# Patient Record
Sex: FEMALE | Race: WHITE | URBAN - METROPOLITAN AREA
[De-identification: names, ages, dates, MRNs, and addresses within clinical notes are randomized per-mention and may not be internally consistent; named-entity substitution may affect disease eponyms.]

---

## 2021-04-16 ENCOUNTER — APPOINTMENT (RX ONLY)
Dept: URBAN - METROPOLITAN AREA CLINIC 61 | Facility: CLINIC | Age: 66
Setting detail: DERMATOLOGY
End: 2021-04-16

## 2021-04-16 DIAGNOSIS — D485 NEOPLASM OF UNCERTAIN BEHAVIOR OF SKIN: ICD-10-CM | Status: INADEQUATELY CONTROLLED

## 2021-04-16 DIAGNOSIS — D22 MELANOCYTIC NEVI: ICD-10-CM | Status: STABLE

## 2021-04-16 DIAGNOSIS — L57.0 ACTINIC KERATOSIS: ICD-10-CM | Status: INADEQUATELY CONTROLLED

## 2021-04-16 DIAGNOSIS — L85.3 XEROSIS CUTIS: ICD-10-CM

## 2021-04-16 DIAGNOSIS — L81.4 OTHER MELANIN HYPERPIGMENTATION: ICD-10-CM | Status: STABLE

## 2021-04-16 DIAGNOSIS — L70.8 OTHER ACNE: ICD-10-CM

## 2021-04-16 DIAGNOSIS — L82.1 OTHER SEBORRHEIC KERATOSIS: ICD-10-CM | Status: STABLE

## 2021-04-16 DIAGNOSIS — L81.7 PIGMENTED PURPURIC DERMATOSIS: ICD-10-CM | Status: STABLE

## 2021-04-16 DIAGNOSIS — D18.0 HEMANGIOMA: ICD-10-CM | Status: STABLE

## 2021-04-16 DIAGNOSIS — Z85.820 PERSONAL HISTORY OF MALIGNANT MELANOMA OF SKIN: ICD-10-CM

## 2021-04-16 PROBLEM — D48.5 NEOPLASM OF UNCERTAIN BEHAVIOR OF SKIN: Status: ACTIVE | Noted: 2021-04-16

## 2021-04-16 PROBLEM — D18.01 HEMANGIOMA OF SKIN AND SUBCUTANEOUS TISSUE: Status: ACTIVE | Noted: 2021-04-16

## 2021-04-16 PROBLEM — D22.5 MELANOCYTIC NEVI OF TRUNK: Status: ACTIVE | Noted: 2021-04-16

## 2021-04-16 PROCEDURE — 11103 TANGNTL BX SKIN EA SEP/ADDL: CPT

## 2021-04-16 PROCEDURE — ? SUNSCREEN RECOMMENDATIONS

## 2021-04-16 PROCEDURE — 17000 DESTRUCT PREMALG LESION: CPT | Mod: 59

## 2021-04-16 PROCEDURE — ? LIQUID NITROGEN

## 2021-04-16 PROCEDURE — ? FULL BODY SKIN EXAM

## 2021-04-16 PROCEDURE — 99203 OFFICE O/P NEW LOW 30 MIN: CPT | Mod: 25

## 2021-04-16 PROCEDURE — ? BIOPSY BY SHAVE METHOD

## 2021-04-16 PROCEDURE — 10040 EXTRACTION: CPT

## 2021-04-16 PROCEDURE — ? COUNSELING

## 2021-04-16 PROCEDURE — 11102 TANGNTL BX SKIN SINGLE LES: CPT

## 2021-04-16 PROCEDURE — ? ACNE SURGERY

## 2021-04-16 ASSESSMENT — LOCATION DETAILED DESCRIPTION DERM
LOCATION DETAILED: RIGHT ANKLE
LOCATION DETAILED: LOWER STERNUM
LOCATION DETAILED: EPIGASTRIC SKIN
LOCATION DETAILED: RIGHT INFERIOR LATERAL FOREHEAD
LOCATION DETAILED: LEFT INFERIOR MEDIAL UPPER BACK
LOCATION DETAILED: RIGHT SUPERIOR MEDIAL UPPER BACK
LOCATION DETAILED: LEFT SUPERIOR UPPER BACK
LOCATION DETAILED: SUBXIPHOID
LOCATION DETAILED: RIGHT INFERIOR MEDIAL UPPER BACK

## 2021-04-16 ASSESSMENT — LOCATION SIMPLE DESCRIPTION DERM
LOCATION SIMPLE: RIGHT UPPER BACK
LOCATION SIMPLE: ABDOMEN
LOCATION SIMPLE: CHEST
LOCATION SIMPLE: RIGHT FOREHEAD
LOCATION SIMPLE: LEFT UPPER BACK
LOCATION SIMPLE: RIGHT ANKLE

## 2021-04-16 ASSESSMENT — LOCATION ZONE DERM
LOCATION ZONE: TRUNK
LOCATION ZONE: FACE
LOCATION ZONE: LEG

## 2021-04-16 ASSESSMENT — SEVERITY ASSESSMENT: SEVERITY: MILD

## 2021-04-16 NOTE — PROCEDURE: ACNE SURGERY
Render Number Of Lesions Treated: no
Extraction Method: 11 blade and q-tip
Post-Care Instructions: I reviewed with the patient in detail post-care instructions. Patient is to keep the treatment areas dry overnight, and then apply bacitracin twice daily until healed. Patient may apply hydrogen peroxide soaks to remove any crusting.
Consent was obtained and risks were reviewed including but not limited to scarring, infection, bleeding, scabbing, incomplete removal, and allergy to anesthesia.
Prep Text (Optional): Prior to removal the treatment areas were prepped in the usual fashion.
Detail Level: Detailed
Acne Type: Comedonal Lesions

## 2021-05-04 ENCOUNTER — APPOINTMENT (RX ONLY)
Dept: URBAN - METROPOLITAN AREA CLINIC 61 | Facility: CLINIC | Age: 66
Setting detail: DERMATOLOGY
End: 2021-05-04

## 2021-05-04 VITALS — TEMPERATURE: 97.6 F

## 2021-05-04 PROBLEM — D03.59 MELANOMA IN SITU OF OTHER PART OF TRUNK: Status: ACTIVE | Noted: 2021-05-04

## 2021-05-04 PROCEDURE — 12032 INTMD RPR S/A/T/EXT 2.6-7.5: CPT

## 2021-05-04 PROCEDURE — 11602 EXC TR-EXT MAL+MARG 1.1-2 CM: CPT

## 2021-05-04 PROCEDURE — ? EXCISION

## 2021-05-18 ENCOUNTER — APPOINTMENT (RX ONLY)
Dept: URBAN - METROPOLITAN AREA CLINIC 61 | Facility: CLINIC | Age: 66
Setting detail: DERMATOLOGY
End: 2021-05-18

## 2021-05-18 DIAGNOSIS — Z48.02 ENCOUNTER FOR REMOVAL OF SUTURES: ICD-10-CM

## 2021-05-18 PROCEDURE — 99024 POSTOP FOLLOW-UP VISIT: CPT

## 2021-05-18 PROCEDURE — ? SUTURE REMOVAL (GLOBAL PERIOD)

## 2021-05-18 ASSESSMENT — LOCATION ZONE DERM: LOCATION ZONE: TRUNK

## 2021-05-18 ASSESSMENT — LOCATION SIMPLE DESCRIPTION DERM: LOCATION SIMPLE: RIGHT UPPER BACK

## 2021-05-18 ASSESSMENT — LOCATION DETAILED DESCRIPTION DERM: LOCATION DETAILED: RIGHT MEDIAL UPPER BACK

## 2021-05-18 NOTE — PROCEDURE: SUTURE REMOVAL (GLOBAL PERIOD)
Detail Level: Detailed
Add 53375 Cpt? (Important Note: In 2017 The Use Of 45740 Is Being Tracked By Cms To Determine Future Global Period Reimbursement For Global Periods): yes

## 2021-08-17 ENCOUNTER — APPOINTMENT (RX ONLY)
Dept: URBAN - METROPOLITAN AREA CLINIC 61 | Facility: CLINIC | Age: 66
Setting detail: DERMATOLOGY
End: 2021-08-17

## 2021-08-17 DIAGNOSIS — Z12.83 ENCOUNTER FOR SCREENING FOR MALIGNANT NEOPLASM OF SKIN: ICD-10-CM

## 2021-08-17 DIAGNOSIS — Z85.820 PERSONAL HISTORY OF MALIGNANT MELANOMA OF SKIN: ICD-10-CM

## 2021-08-17 DIAGNOSIS — L81.4 OTHER MELANIN HYPERPIGMENTATION: ICD-10-CM

## 2021-08-17 DIAGNOSIS — L82.1 OTHER SEBORRHEIC KERATOSIS: ICD-10-CM

## 2021-08-17 DIAGNOSIS — L20.89 OTHER ATOPIC DERMATITIS: ICD-10-CM | Status: INADEQUATELY CONTROLLED

## 2021-08-17 DIAGNOSIS — D22 MELANOCYTIC NEVI: ICD-10-CM

## 2021-08-17 DIAGNOSIS — D18.0 HEMANGIOMA: ICD-10-CM

## 2021-08-17 PROBLEM — D22.5 MELANOCYTIC NEVI OF TRUNK: Status: ACTIVE | Noted: 2021-08-17

## 2021-08-17 PROBLEM — D18.01 HEMANGIOMA OF SKIN AND SUBCUTANEOUS TISSUE: Status: ACTIVE | Noted: 2021-08-17

## 2021-08-17 PROCEDURE — ? COUNSELING

## 2021-08-17 PROCEDURE — ? PRESCRIPTION

## 2021-08-17 PROCEDURE — ? SUNSCREEN RECOMMENDATIONS

## 2021-08-17 PROCEDURE — 99214 OFFICE O/P EST MOD 30 MIN: CPT

## 2021-08-17 PROCEDURE — ? FULL BODY SKIN EXAM

## 2021-08-17 RX ORDER — TRIAMCINOLONE ACETONIDE 1 MG/G
CREAM TOPICAL BID
Qty: 1 | Refills: 6 | Status: ERX | COMMUNITY
Start: 2021-08-17

## 2021-08-17 RX ADMIN — TRIAMCINOLONE ACETONIDE 1: 1 CREAM TOPICAL at 00:00

## 2021-08-17 ASSESSMENT — LOCATION SIMPLE DESCRIPTION DERM
LOCATION SIMPLE: RIGHT ELBOW
LOCATION SIMPLE: RIGHT UPPER BACK
LOCATION SIMPLE: ABDOMEN

## 2021-08-17 ASSESSMENT — LOCATION ZONE DERM
LOCATION ZONE: TRUNK
LOCATION ZONE: ARM

## 2021-08-17 ASSESSMENT — LOCATION DETAILED DESCRIPTION DERM
LOCATION DETAILED: RIGHT MEDIAL UPPER BACK
LOCATION DETAILED: PERIUMBILICAL SKIN
LOCATION DETAILED: RIGHT SUPERIOR MEDIAL UPPER BACK
LOCATION DETAILED: RIGHT ELBOW

## 2021-08-17 ASSESSMENT — SEVERITY ASSESSMENT 2020: SEVERITY 2020: MILD

## 2021-11-16 ENCOUNTER — APPOINTMENT (RX ONLY)
Dept: URBAN - METROPOLITAN AREA CLINIC 61 | Facility: CLINIC | Age: 66
Setting detail: DERMATOLOGY
End: 2021-11-16

## 2021-11-16 DIAGNOSIS — L82.1 OTHER SEBORRHEIC KERATOSIS: ICD-10-CM

## 2021-11-16 DIAGNOSIS — Z86.006 PERSONAL HISTORY OF MELANOMA IN-SITU: ICD-10-CM | Status: RESOLVED

## 2021-11-16 DIAGNOSIS — D22 MELANOCYTIC NEVI: ICD-10-CM | Status: STABLE

## 2021-11-16 DIAGNOSIS — D18.0 HEMANGIOMA: ICD-10-CM

## 2021-11-16 DIAGNOSIS — L81.4 OTHER MELANIN HYPERPIGMENTATION: ICD-10-CM | Status: STABLE

## 2021-11-16 DIAGNOSIS — H02.83 DERMATOCHALASIS OF EYELID: ICD-10-CM

## 2021-11-16 DIAGNOSIS — Z12.83 ENCOUNTER FOR SCREENING FOR MALIGNANT NEOPLASM OF SKIN: ICD-10-CM

## 2021-11-16 PROBLEM — D22.9 MELANOCYTIC NEVI, UNSPECIFIED: Status: ACTIVE | Noted: 2021-11-16

## 2021-11-16 PROBLEM — D18.01 HEMANGIOMA OF SKIN AND SUBCUTANEOUS TISSUE: Status: ACTIVE | Noted: 2021-11-16

## 2021-11-16 PROBLEM — Z85.820 PERSONAL HISTORY OF MALIGNANT MELANOMA OF SKIN: Status: ACTIVE | Noted: 2021-11-16

## 2021-11-16 PROBLEM — H02.839 DERMATOCHALASIS OF UNSPECIFIED EYE, UNSPECIFIED EYELID: Status: ACTIVE | Noted: 2021-11-16

## 2021-11-16 PROCEDURE — ? ADDITIONAL NOTES

## 2021-11-16 PROCEDURE — ? PATIENT SPECIFIC COUNSELING

## 2021-11-16 PROCEDURE — ? COUNSELING

## 2021-11-16 PROCEDURE — ? FULL BODY SKIN EXAM

## 2021-11-16 PROCEDURE — 99213 OFFICE O/P EST LOW 20 MIN: CPT

## 2021-11-16 ASSESSMENT — LOCATION DETAILED DESCRIPTION DERM
LOCATION DETAILED: LEFT LATERAL EYEBROW
LOCATION DETAILED: LEFT PROXIMAL DORSAL FOREARM
LOCATION DETAILED: INFERIOR THORACIC SPINE
LOCATION DETAILED: RIGHT LATERAL EYEBROW
LOCATION DETAILED: LEFT ANTERIOR DISTAL THIGH

## 2021-11-16 ASSESSMENT — LOCATION ZONE DERM
LOCATION ZONE: FACE
LOCATION ZONE: LEG
LOCATION ZONE: ARM
LOCATION ZONE: TRUNK

## 2021-11-16 ASSESSMENT — LOCATION SIMPLE DESCRIPTION DERM
LOCATION SIMPLE: LEFT EYEBROW
LOCATION SIMPLE: UPPER BACK
LOCATION SIMPLE: LEFT THIGH
LOCATION SIMPLE: RIGHT EYEBROW
LOCATION SIMPLE: LEFT FOREARM

## 2021-11-16 NOTE — HPI: OTHER
Condition:: excess skin above eyelid
Please Describe Your Condition:: Patient complains of excess skin above eyelids that interferes with vision and causes her eyelids to feel heavy. Presents for evaluation and management.

## 2022-04-06 ENCOUNTER — APPOINTMENT (RX ONLY)
Dept: URBAN - METROPOLITAN AREA CLINIC 61 | Facility: CLINIC | Age: 67
Setting detail: DERMATOLOGY
End: 2022-04-06

## 2022-04-06 DIAGNOSIS — D22 MELANOCYTIC NEVI: ICD-10-CM

## 2022-04-06 DIAGNOSIS — Z86.006 PERSONAL HISTORY OF MELANOMA IN-SITU: ICD-10-CM

## 2022-04-06 DIAGNOSIS — L81.4 OTHER MELANIN HYPERPIGMENTATION: ICD-10-CM

## 2022-04-06 DIAGNOSIS — D49.2 NEOPLASM OF UNSPECIFIED BEHAVIOR OF BONE, SOFT TISSUE, AND SKIN: ICD-10-CM | Status: WORSENING

## 2022-04-06 DIAGNOSIS — Z71.89 OTHER SPECIFIED COUNSELING: ICD-10-CM

## 2022-04-06 DIAGNOSIS — L90.5 SCAR CONDITIONS AND FIBROSIS OF SKIN: ICD-10-CM

## 2022-04-06 DIAGNOSIS — L82.1 OTHER SEBORRHEIC KERATOSIS: ICD-10-CM

## 2022-04-06 PROBLEM — D22.5 MELANOCYTIC NEVI OF TRUNK: Status: ACTIVE | Noted: 2022-04-06

## 2022-04-06 PROCEDURE — ? SHAVE REMOVAL

## 2022-04-06 PROCEDURE — ? FULL BODY SKIN EXAM

## 2022-04-06 PROCEDURE — 99213 OFFICE O/P EST LOW 20 MIN: CPT | Mod: 25

## 2022-04-06 PROCEDURE — ? ADDITIONAL NOTES

## 2022-04-06 PROCEDURE — ? TREATMENT REGIMEN

## 2022-04-06 PROCEDURE — ? COUNSELING

## 2022-04-06 PROCEDURE — 11300 SHAVE SKIN LESION 0.5 CM/<: CPT

## 2022-04-06 ASSESSMENT — LOCATION SIMPLE DESCRIPTION DERM
LOCATION SIMPLE: UPPER BACK
LOCATION SIMPLE: LEFT FOREARM
LOCATION SIMPLE: UPPER BACK
LOCATION SIMPLE: RIGHT FOREARM
LOCATION SIMPLE: LEFT PRETIBIAL REGION

## 2022-04-06 ASSESSMENT — LOCATION DETAILED DESCRIPTION DERM
LOCATION DETAILED: RIGHT PROXIMAL RADIAL DORSAL FOREARM
LOCATION DETAILED: SUPERIOR THORACIC SPINE
LOCATION DETAILED: INFERIOR THORACIC SPINE
LOCATION DETAILED: INFERIOR THORACIC SPINE
LOCATION DETAILED: LEFT PROXIMAL DORSAL FOREARM
LOCATION DETAILED: LEFT LATERAL PROXIMAL PRETIBIAL REGION

## 2022-04-06 ASSESSMENT — LOCATION ZONE DERM
LOCATION ZONE: TRUNK
LOCATION ZONE: TRUNK
LOCATION ZONE: ARM
LOCATION ZONE: LEG

## 2022-04-06 NOTE — PROCEDURE: SHAVE REMOVAL
Medical Necessity Information: It is in your best interest to select a reason for this procedure from the list below. All of these items fulfill various CMS LCD requirements except the new and changing color options.
Medical Necessity Clause: The provider's intent was to remove the lesion in its entirety while at the same time obtaining tissue for pathology. This procedure was medically necessary because the lesion that was treated was:
Lab: 6
Lab Facility: 3
Detail Level: Detailed
Was A Bandage Applied: Yes
Size Of Lesion In Cm (Required): 0.3
X Size Of Lesion In Cm (Optional): 0
Biopsy Method: Personna blade
Anesthesia Type: 1% lidocaine with epinephrine
Anesthesia Volume In Cc: 0.5
Hemostasis: Drysol
Wound Care: Vaseline
Render Path Notes In Note?: No
Consent was obtained from the patient. The risks and benefits to therapy were discussed in detail. Specifically, the risks of infection, scarring, bleeding, prolonged wound healing, incomplete removal, allergy to anesthesia, nerve injury and recurrence were addressed. Prior to the procedure, the treatment site was clearly identified and confirmed by the patient. All components of Universal Protocol completed.
Post-Care Instructions: I reviewed with the patient in detail post-care instructions. Patient is to keep the biopsy site dry overnight, and then apply vaseline twice daily until healed.
Notification Instructions: Patient will be notified of pathology results. However, patient instructed to call the office if not contacted within 2 weeks.
Billing Type: Third-Party Bill

## 2022-08-11 ENCOUNTER — APPOINTMENT (RX ONLY)
Dept: URBAN - METROPOLITAN AREA CLINIC 61 | Facility: CLINIC | Age: 67
Setting detail: DERMATOLOGY
End: 2022-08-11

## 2022-08-11 DIAGNOSIS — D18.0 HEMANGIOMA: ICD-10-CM

## 2022-08-11 DIAGNOSIS — Z86.006 PERSONAL HISTORY OF MELANOMA IN-SITU: ICD-10-CM

## 2022-08-11 DIAGNOSIS — B35.3 TINEA PEDIS: ICD-10-CM | Status: STABLE

## 2022-08-11 DIAGNOSIS — L82.1 OTHER SEBORRHEIC KERATOSIS: ICD-10-CM

## 2022-08-11 DIAGNOSIS — L90.8 OTHER ATROPHIC DISORDERS OF SKIN: ICD-10-CM | Status: INADEQUATELY CONTROLLED

## 2022-08-11 DIAGNOSIS — D22 MELANOCYTIC NEVI: ICD-10-CM

## 2022-08-11 DIAGNOSIS — L81.4 OTHER MELANIN HYPERPIGMENTATION: ICD-10-CM

## 2022-08-11 DIAGNOSIS — Z12.83 ENCOUNTER FOR SCREENING FOR MALIGNANT NEOPLASM OF SKIN: ICD-10-CM

## 2022-08-11 PROBLEM — D22.5 MELANOCYTIC NEVI OF TRUNK: Status: ACTIVE | Noted: 2022-08-11

## 2022-08-11 PROBLEM — D18.01 HEMANGIOMA OF SKIN AND SUBCUTANEOUS TISSUE: Status: ACTIVE | Noted: 2022-08-11

## 2022-08-11 PROCEDURE — 99213 OFFICE O/P EST LOW 20 MIN: CPT

## 2022-08-11 PROCEDURE — ? ADDITIONAL NOTES

## 2022-08-11 PROCEDURE — ? SUNSCREEN RECOMMENDATIONS

## 2022-08-11 PROCEDURE — ? FULL BODY SKIN EXAM

## 2022-08-11 PROCEDURE — ? PRESCRIPTION

## 2022-08-11 PROCEDURE — ? COUNSELING

## 2022-08-11 RX ORDER — KETOCONAZOLE 20 MG/G
CREAM TOPICAL BID
Qty: 30 | Refills: 1 | Status: ERX | COMMUNITY
Start: 2022-08-11

## 2022-08-11 RX ADMIN — KETOCONAZOLE: 20 CREAM TOPICAL at 00:00

## 2022-08-11 ASSESSMENT — LOCATION SIMPLE DESCRIPTION DERM
LOCATION SIMPLE: LEFT EYEBROW
LOCATION SIMPLE: LOWER BACK
LOCATION SIMPLE: RIGHT 5TH TOE
LOCATION SIMPLE: RIGHT EYEBROW
LOCATION SIMPLE: RIGHT UPPER BACK
LOCATION SIMPLE: UPPER BACK
LOCATION SIMPLE: ABDOMEN
LOCATION SIMPLE: CHEST
LOCATION SIMPLE: LEFT UPPER BACK

## 2022-08-11 ASSESSMENT — LOCATION DETAILED DESCRIPTION DERM
LOCATION DETAILED: PERIUMBILICAL SKIN
LOCATION DETAILED: RIGHT INFERIOR MEDIAL UPPER BACK
LOCATION DETAILED: LEFT LATERAL EYEBROW
LOCATION DETAILED: LEFT MEDIAL UPPER BACK
LOCATION DETAILED: SUPERIOR THORACIC SPINE
LOCATION DETAILED: UPPER STERNUM
LOCATION DETAILED: RIGHT LATERAL EYEBROW
LOCATION DETAILED: RIGHT MEDIAL 5TH TOE
LOCATION DETAILED: SUPERIOR LUMBAR SPINE

## 2022-08-11 ASSESSMENT — LOCATION ZONE DERM
LOCATION ZONE: TRUNK
LOCATION ZONE: TOE
LOCATION ZONE: FACE

## 2022-12-22 ENCOUNTER — APPOINTMENT (RX ONLY)
Dept: URBAN - METROPOLITAN AREA CLINIC 61 | Facility: CLINIC | Age: 67
Setting detail: DERMATOLOGY
End: 2022-12-22

## 2022-12-22 DIAGNOSIS — L29.89 OTHER PRURITUS: ICD-10-CM | Status: INADEQUATELY CONTROLLED

## 2022-12-22 DIAGNOSIS — L90.8 OTHER ATROPHIC DISORDERS OF SKIN: ICD-10-CM | Status: WORSENING

## 2022-12-22 DIAGNOSIS — L81.4 OTHER MELANIN HYPERPIGMENTATION: ICD-10-CM

## 2022-12-22 DIAGNOSIS — Q828 OTHER SPECIFIED ANOMALIES OF SKIN: ICD-10-CM | Status: INADEQUATELY CONTROLLED

## 2022-12-22 DIAGNOSIS — Z12.83 ENCOUNTER FOR SCREENING FOR MALIGNANT NEOPLASM OF SKIN: ICD-10-CM

## 2022-12-22 DIAGNOSIS — L29.8 OTHER PRURITUS: ICD-10-CM | Status: INADEQUATELY CONTROLLED

## 2022-12-22 DIAGNOSIS — Q819 OTHER SPECIFIED ANOMALIES OF SKIN: ICD-10-CM | Status: INADEQUATELY CONTROLLED

## 2022-12-22 DIAGNOSIS — D22 MELANOCYTIC NEVI: ICD-10-CM

## 2022-12-22 DIAGNOSIS — Z86.006 PERSONAL HISTORY OF MELANOMA IN-SITU: ICD-10-CM

## 2022-12-22 DIAGNOSIS — D18.0 HEMANGIOMA: ICD-10-CM

## 2022-12-22 DIAGNOSIS — L82.1 OTHER SEBORRHEIC KERATOSIS: ICD-10-CM

## 2022-12-22 DIAGNOSIS — Q826 OTHER SPECIFIED ANOMALIES OF SKIN: ICD-10-CM | Status: INADEQUATELY CONTROLLED

## 2022-12-22 PROBLEM — D18.01 HEMANGIOMA OF SKIN AND SUBCUTANEOUS TISSUE: Status: ACTIVE | Noted: 2022-12-22

## 2022-12-22 PROBLEM — L85.8 OTHER SPECIFIED EPIDERMAL THICKENING: Status: ACTIVE | Noted: 2022-12-22

## 2022-12-22 PROBLEM — D22.5 MELANOCYTIC NEVI OF TRUNK: Status: ACTIVE | Noted: 2022-12-22

## 2022-12-22 PROCEDURE — ? FULL BODY SKIN EXAM

## 2022-12-22 PROCEDURE — ? COUNSELING

## 2022-12-22 PROCEDURE — ? PRESCRIPTION

## 2022-12-22 PROCEDURE — ? ADDITIONAL NOTES

## 2022-12-22 PROCEDURE — 99214 OFFICE O/P EST MOD 30 MIN: CPT

## 2022-12-22 RX ORDER — CLOBETASOL PROPIONATE 0.5 MG/ML
SOLUTION TOPICAL
Qty: 100 | Refills: 6 | Status: ERX | COMMUNITY
Start: 2022-12-22

## 2022-12-22 RX ORDER — AMMONIUM LACTATE 12 %
LOTION (GRAM) TOPICAL AS DIRECTED
Qty: 227 | Refills: 6 | Status: ERX | COMMUNITY
Start: 2022-12-22

## 2022-12-22 RX ADMIN — CLOBETASOL PROPIONATE: 0.5 SOLUTION TOPICAL at 00:00

## 2022-12-22 RX ADMIN — Medication: at 00:00

## 2022-12-22 ASSESSMENT — LOCATION SIMPLE DESCRIPTION DERM
LOCATION SIMPLE: LEFT THIGH
LOCATION SIMPLE: LOWER BACK
LOCATION SIMPLE: UPPER BACK
LOCATION SIMPLE: LEFT UPPER BACK
LOCATION SIMPLE: RIGHT EYEBROW
LOCATION SIMPLE: RIGHT POSTERIOR UPPER ARM
LOCATION SIMPLE: RIGHT UPPER BACK
LOCATION SIMPLE: CHEST
LOCATION SIMPLE: ABDOMEN
LOCATION SIMPLE: LEFT POSTERIOR UPPER ARM
LOCATION SIMPLE: LEFT EYEBROW
LOCATION SIMPLE: RIGHT THIGH

## 2022-12-22 ASSESSMENT — LOCATION DETAILED DESCRIPTION DERM
LOCATION DETAILED: LEFT LATERAL EYEBROW
LOCATION DETAILED: UPPER STERNUM
LOCATION DETAILED: SUPERIOR THORACIC SPINE
LOCATION DETAILED: LEFT DISTAL POSTERIOR UPPER ARM
LOCATION DETAILED: SUPERIOR LUMBAR SPINE
LOCATION DETAILED: RIGHT ANTERIOR DISTAL THIGH
LOCATION DETAILED: RIGHT DISTAL POSTERIOR UPPER ARM
LOCATION DETAILED: RIGHT LATERAL EYEBROW
LOCATION DETAILED: PERIUMBILICAL SKIN
LOCATION DETAILED: RIGHT INFERIOR MEDIAL UPPER BACK
LOCATION DETAILED: LEFT ANTERIOR DISTAL THIGH
LOCATION DETAILED: LEFT MEDIAL UPPER BACK

## 2022-12-22 ASSESSMENT — LOCATION ZONE DERM
LOCATION ZONE: FACE
LOCATION ZONE: TRUNK
LOCATION ZONE: LEG
LOCATION ZONE: ARM

## 2022-12-22 ASSESSMENT — SEVERITY ASSESSMENT: SEVERITY: MODERATE

## 2022-12-22 NOTE — HPI: SKIN IRRITATION
How Severe Is Your Skin Irritation?: moderate
Additional History: Intermittent itching only when dairy is consumed.

## 2023-03-23 ENCOUNTER — APPOINTMENT (RX ONLY)
Dept: URBAN - METROPOLITAN AREA CLINIC 61 | Facility: CLINIC | Age: 68
Setting detail: DERMATOLOGY
End: 2023-03-23

## 2023-03-23 DIAGNOSIS — L81.4 OTHER MELANIN HYPERPIGMENTATION: ICD-10-CM

## 2023-03-23 DIAGNOSIS — Z86.006 PERSONAL HISTORY OF MELANOMA IN-SITU: ICD-10-CM

## 2023-03-23 DIAGNOSIS — Z12.83 ENCOUNTER FOR SCREENING FOR MALIGNANT NEOPLASM OF SKIN: ICD-10-CM

## 2023-03-23 DIAGNOSIS — D22 MELANOCYTIC NEVI: ICD-10-CM

## 2023-03-23 DIAGNOSIS — L82.1 OTHER SEBORRHEIC KERATOSIS: ICD-10-CM

## 2023-03-23 DIAGNOSIS — D18.0 HEMANGIOMA: ICD-10-CM

## 2023-03-23 PROBLEM — D18.01 HEMANGIOMA OF SKIN AND SUBCUTANEOUS TISSUE: Status: ACTIVE | Noted: 2023-03-23

## 2023-03-23 PROBLEM — D22.5 MELANOCYTIC NEVI OF TRUNK: Status: ACTIVE | Noted: 2023-03-23

## 2023-03-23 PROCEDURE — ? FULL BODY SKIN EXAM

## 2023-03-23 PROCEDURE — ? ADDITIONAL NOTES

## 2023-03-23 PROCEDURE — ? COUNSELING

## 2023-03-23 PROCEDURE — 99213 OFFICE O/P EST LOW 20 MIN: CPT

## 2023-03-23 ASSESSMENT — LOCATION ZONE DERM: LOCATION ZONE: TRUNK

## 2023-03-23 ASSESSMENT — LOCATION DETAILED DESCRIPTION DERM
LOCATION DETAILED: UPPER STERNUM
LOCATION DETAILED: PERIUMBILICAL SKIN
LOCATION DETAILED: RIGHT INFERIOR MEDIAL UPPER BACK
LOCATION DETAILED: SUPERIOR LUMBAR SPINE
LOCATION DETAILED: LEFT MEDIAL UPPER BACK
LOCATION DETAILED: SUPERIOR THORACIC SPINE

## 2023-03-23 ASSESSMENT — LOCATION SIMPLE DESCRIPTION DERM
LOCATION SIMPLE: UPPER BACK
LOCATION SIMPLE: RIGHT UPPER BACK
LOCATION SIMPLE: ABDOMEN
LOCATION SIMPLE: LEFT UPPER BACK
LOCATION SIMPLE: LOWER BACK
LOCATION SIMPLE: CHEST

## 2023-06-29 ENCOUNTER — APPOINTMENT (RX ONLY)
Dept: URBAN - METROPOLITAN AREA CLINIC 61 | Facility: CLINIC | Age: 68
Setting detail: DERMATOLOGY
End: 2023-06-29

## 2023-06-29 DIAGNOSIS — D18.0 HEMANGIOMA: ICD-10-CM

## 2023-06-29 DIAGNOSIS — L81.4 OTHER MELANIN HYPERPIGMENTATION: ICD-10-CM

## 2023-06-29 DIAGNOSIS — Z12.83 ENCOUNTER FOR SCREENING FOR MALIGNANT NEOPLASM OF SKIN: ICD-10-CM

## 2023-06-29 DIAGNOSIS — D22 MELANOCYTIC NEVI: ICD-10-CM

## 2023-06-29 DIAGNOSIS — Z86.006 PERSONAL HISTORY OF MELANOMA IN-SITU: ICD-10-CM

## 2023-06-29 DIAGNOSIS — L82.1 OTHER SEBORRHEIC KERATOSIS: ICD-10-CM

## 2023-06-29 PROBLEM — D18.01 HEMANGIOMA OF SKIN AND SUBCUTANEOUS TISSUE: Status: ACTIVE | Noted: 2023-06-29

## 2023-06-29 PROBLEM — D22.5 MELANOCYTIC NEVI OF TRUNK: Status: ACTIVE | Noted: 2023-06-29

## 2023-06-29 PROCEDURE — ? COUNSELING

## 2023-06-29 PROCEDURE — ? FULL BODY SKIN EXAM

## 2023-06-29 PROCEDURE — 99213 OFFICE O/P EST LOW 20 MIN: CPT

## 2023-06-29 PROCEDURE — ? ADDITIONAL NOTES

## 2023-06-29 ASSESSMENT — LOCATION SIMPLE DESCRIPTION DERM
LOCATION SIMPLE: RIGHT UPPER BACK
LOCATION SIMPLE: ABDOMEN
LOCATION SIMPLE: CHEST
LOCATION SIMPLE: UPPER BACK
LOCATION SIMPLE: LOWER BACK
LOCATION SIMPLE: LEFT UPPER BACK

## 2023-06-29 ASSESSMENT — LOCATION ZONE DERM: LOCATION ZONE: TRUNK

## 2023-06-29 ASSESSMENT — LOCATION DETAILED DESCRIPTION DERM
LOCATION DETAILED: SUPERIOR LUMBAR SPINE
LOCATION DETAILED: RIGHT INFERIOR MEDIAL UPPER BACK
LOCATION DETAILED: LEFT MEDIAL UPPER BACK
LOCATION DETAILED: UPPER STERNUM
LOCATION DETAILED: SUPERIOR THORACIC SPINE
LOCATION DETAILED: PERIUMBILICAL SKIN

## 2023-06-29 NOTE — HPI: EVALUATION OF SKIN LESION(S)
Hpi Title: Evaluation of Skin Lesions
How Severe Are Your Spot(S)?: mild
Have Your Spot(S) Been Treated In The Past?: has not been treated
Year Removed: 2021 may

## 2023-06-29 NOTE — PROCEDURE: MIPS QUALITY
Quality 137: Melanoma: Continuity Of Care - Recall System: Patient information entered into a recall system that includes: target date for the next exam specified AND a process to follow up with patients regarding missed or unscheduled appointments
Detail Level: Detailed
Quality 138: Melanoma: Coordination Of Care: A treatment plan was communicated to the physicians providing continuing care within one month of diagnosis outlining: diagnosis, tumor thickness and a plan for surgery or alternate care.
negative...

## 2024-01-18 ENCOUNTER — APPOINTMENT (RX ONLY)
Dept: URBAN - METROPOLITAN AREA CLINIC 61 | Facility: CLINIC | Age: 69
Setting detail: DERMATOLOGY
End: 2024-01-18

## 2024-01-18 DIAGNOSIS — D22 MELANOCYTIC NEVI: ICD-10-CM

## 2024-01-18 DIAGNOSIS — L82.1 OTHER SEBORRHEIC KERATOSIS: ICD-10-CM

## 2024-01-18 DIAGNOSIS — Z12.83 ENCOUNTER FOR SCREENING FOR MALIGNANT NEOPLASM OF SKIN: ICD-10-CM

## 2024-01-18 DIAGNOSIS — L72.0 EPIDERMAL CYST: ICD-10-CM

## 2024-01-18 DIAGNOSIS — L81.4 OTHER MELANIN HYPERPIGMENTATION: ICD-10-CM

## 2024-01-18 DIAGNOSIS — D18.0 HEMANGIOMA: ICD-10-CM

## 2024-01-18 DIAGNOSIS — Z86.006 PERSONAL HISTORY OF MELANOMA IN-SITU: ICD-10-CM

## 2024-01-18 PROBLEM — D22.5 MELANOCYTIC NEVI OF TRUNK: Status: ACTIVE | Noted: 2024-01-18

## 2024-01-18 PROBLEM — D18.01 HEMANGIOMA OF SKIN AND SUBCUTANEOUS TISSUE: Status: ACTIVE | Noted: 2024-01-18

## 2024-01-18 PROCEDURE — ? ACNE SURGERY

## 2024-01-18 PROCEDURE — ? COUNSELING

## 2024-01-18 PROCEDURE — 99213 OFFICE O/P EST LOW 20 MIN: CPT | Mod: 25

## 2024-01-18 PROCEDURE — 10040 EXTRACTION: CPT

## 2024-01-18 PROCEDURE — ? FULL BODY SKIN EXAM

## 2024-01-18 PROCEDURE — ? TREATMENT REGIMEN

## 2024-01-18 PROCEDURE — ? ADDITIONAL NOTES

## 2024-01-18 ASSESSMENT — LOCATION SIMPLE DESCRIPTION DERM
LOCATION SIMPLE: RIGHT UPPER BACK
LOCATION SIMPLE: RIGHT AXILLARY VAULT
LOCATION SIMPLE: LEFT UPPER BACK
LOCATION SIMPLE: ABDOMEN
LOCATION SIMPLE: LOWER BACK
LOCATION SIMPLE: LEFT EYEBROW
LOCATION SIMPLE: UPPER BACK
LOCATION SIMPLE: CHEST

## 2024-01-18 ASSESSMENT — LOCATION DETAILED DESCRIPTION DERM
LOCATION DETAILED: SUPERIOR THORACIC SPINE
LOCATION DETAILED: RIGHT INFERIOR MEDIAL UPPER BACK
LOCATION DETAILED: LEFT CENTRAL EYEBROW
LOCATION DETAILED: LEFT MEDIAL UPPER BACK
LOCATION DETAILED: UPPER STERNUM
LOCATION DETAILED: SUPERIOR LUMBAR SPINE
LOCATION DETAILED: LEFT LATERAL EYEBROW
LOCATION DETAILED: PERIUMBILICAL SKIN
LOCATION DETAILED: RIGHT AXILLARY VAULT

## 2024-01-18 ASSESSMENT — LOCATION ZONE DERM
LOCATION ZONE: FACE
LOCATION ZONE: TRUNK
LOCATION ZONE: AXILLAE

## 2024-01-18 NOTE — PROCEDURE: ACNE SURGERY
Hemostasis: Pressure
Extraction Method: 11 blade and q-tip
Render Post-Care Instructions In Note?: no
Acne Type: Milial cysts
Detail Level: Simple
Consent was obtained and risks were reviewed including but not limited to scarring, infection, bleeding, scabbing, incomplete removal, and allergy to anesthesia.
Post-Care Instructions: I reviewed with the patient in detail post-care instructions. Keep area clean with mild soap and water daily.
Prep Text (Optional): Prior to removal the treatment areas were prepped in the usual fashion.
Render Number Of Lesions Treated: yes

## 2024-04-25 ENCOUNTER — APPOINTMENT (RX ONLY)
Dept: URBAN - METROPOLITAN AREA CLINIC 61 | Facility: CLINIC | Age: 69
Setting detail: DERMATOLOGY
End: 2024-04-25

## 2024-04-25 DIAGNOSIS — L20.89 OTHER ATOPIC DERMATITIS: ICD-10-CM

## 2024-04-25 DIAGNOSIS — L73.8 OTHER SPECIFIED FOLLICULAR DISORDERS: ICD-10-CM | Status: INADEQUATELY CONTROLLED

## 2024-04-25 PROCEDURE — ? FULL BODY SKIN EXAM - DECLINED

## 2024-04-25 PROCEDURE — ? TREATMENT GOALS

## 2024-04-25 PROCEDURE — 10040 EXTRACTION: CPT

## 2024-04-25 PROCEDURE — ? COUNSELING

## 2024-04-25 PROCEDURE — 99214 OFFICE O/P EST MOD 30 MIN: CPT | Mod: 25

## 2024-04-25 PROCEDURE — ? PRESCRIPTION MEDICATION MANAGEMENT

## 2024-04-25 PROCEDURE — ? ACNE SURGERY

## 2024-04-25 PROCEDURE — ? PRESCRIPTION

## 2024-04-25 RX ORDER — BETAMETHASONE DIPROPIONATE 0.5 MG/G
CREAM TOPICAL BID
Qty: 50 | Refills: 3 | Status: ERX | COMMUNITY
Start: 2024-04-25

## 2024-04-25 RX ADMIN — BETAMETHASONE DIPROPIONATE: 0.5 CREAM TOPICAL at 00:00

## 2024-04-25 ASSESSMENT — LOCATION SIMPLE DESCRIPTION DERM
LOCATION SIMPLE: LEFT UPPER BACK
LOCATION SIMPLE: RIGHT UPPER BACK

## 2024-04-25 ASSESSMENT — LOCATION DETAILED DESCRIPTION DERM
LOCATION DETAILED: LEFT SUPERIOR UPPER BACK
LOCATION DETAILED: RIGHT SUPERIOR UPPER BACK

## 2024-04-25 ASSESSMENT — SEVERITY ASSESSMENT 2020: SEVERITY 2020: MILD

## 2024-04-25 ASSESSMENT — LOCATION ZONE DERM: LOCATION ZONE: TRUNK

## 2024-04-25 NOTE — PROCEDURE: ACNE SURGERY
Render Number Of Lesions Treated: yes
Extraction Method: cotton-tipped applicators and gentle pressure
Hemostasis: Pressure
Post-Care Instructions: I reviewed with the patient in detail post-care instructions. Keep area clean with mild soap and water daily.
Consent was obtained and risks were reviewed including but not limited to scarring, infection, bleeding, scabbing, incomplete removal, and allergy to anesthesia.
Prep Text (Optional): Prior to removal the treatment areas were prepped in the usual fashion.
Acne Type: Comedonal Lesions
Detail Level: Simple
Render Post-Care Instructions In Note?: no

## 2024-04-25 NOTE — PROCEDURE: PRESCRIPTION MEDICATION MANAGEMENT
Initiate Treatment: Betamethasone cream bid x 2 weeks; as needed for flares.
Detail Level: Zone
Render In Strict Bullet Format?: No

## 2024-04-30 ENCOUNTER — APPOINTMENT (RX ONLY)
Dept: URBAN - METROPOLITAN AREA CLINIC 61 | Facility: CLINIC | Age: 69
Setting detail: DERMATOLOGY
End: 2024-04-30

## 2024-11-07 ENCOUNTER — APPOINTMENT (RX ONLY)
Dept: URBAN - METROPOLITAN AREA CLINIC 61 | Facility: CLINIC | Age: 69
Setting detail: DERMATOLOGY
End: 2024-11-07

## 2024-11-07 DIAGNOSIS — Z12.83 ENCOUNTER FOR SCREENING FOR MALIGNANT NEOPLASM OF SKIN: ICD-10-CM

## 2024-11-07 DIAGNOSIS — D18.0 HEMANGIOMA: ICD-10-CM

## 2024-11-07 DIAGNOSIS — Z86.006 PERSONAL HISTORY OF MELANOMA IN-SITU: ICD-10-CM | Status: RESOLVED

## 2024-11-07 DIAGNOSIS — L81.4 OTHER MELANIN HYPERPIGMENTATION: ICD-10-CM

## 2024-11-07 DIAGNOSIS — D22 MELANOCYTIC NEVI: ICD-10-CM

## 2024-11-07 DIAGNOSIS — L82.1 OTHER SEBORRHEIC KERATOSIS: ICD-10-CM

## 2024-11-07 PROBLEM — D18.01 HEMANGIOMA OF SKIN AND SUBCUTANEOUS TISSUE: Status: ACTIVE | Noted: 2024-11-07

## 2024-11-07 PROBLEM — D22.5 MELANOCYTIC NEVI OF TRUNK: Status: ACTIVE | Noted: 2024-11-07

## 2024-11-07 PROCEDURE — ? TREATMENT REGIMEN

## 2024-11-07 PROCEDURE — 99213 OFFICE O/P EST LOW 20 MIN: CPT

## 2024-11-07 PROCEDURE — ? COUNSELING

## 2024-11-07 PROCEDURE — ? FULL BODY SKIN EXAM

## 2024-11-07 ASSESSMENT — LOCATION SIMPLE DESCRIPTION DERM
LOCATION SIMPLE: LOWER BACK
LOCATION SIMPLE: LEFT UPPER BACK
LOCATION SIMPLE: RIGHT UPPER BACK
LOCATION SIMPLE: CHEST
LOCATION SIMPLE: UPPER BACK
LOCATION SIMPLE: ABDOMEN

## 2024-11-07 ASSESSMENT — LOCATION DETAILED DESCRIPTION DERM
LOCATION DETAILED: LEFT MEDIAL UPPER BACK
LOCATION DETAILED: PERIUMBILICAL SKIN
LOCATION DETAILED: RIGHT INFERIOR MEDIAL UPPER BACK
LOCATION DETAILED: UPPER STERNUM
LOCATION DETAILED: SUPERIOR THORACIC SPINE
LOCATION DETAILED: SUPERIOR LUMBAR SPINE

## 2024-11-07 ASSESSMENT — LOCATION ZONE DERM: LOCATION ZONE: TRUNK

## 2024-11-07 NOTE — HPI: EVALUATION OF SKIN LESION(S)
What Type Of Note Output Would You Prefer (Optional)?: Standard Output
Hpi Title: Evaluation of Skin Lesions
How Severe Are Your Spot(S)?: mild
Have Your Spot(S) Been Treated In The Past?: has not been treated
Location: Back
Year Removed: 2018, 2021

## 2025-05-08 ENCOUNTER — APPOINTMENT (OUTPATIENT)
Dept: URBAN - METROPOLITAN AREA CLINIC 61 | Facility: CLINIC | Age: 70
Setting detail: DERMATOLOGY
End: 2025-05-08

## 2025-05-08 DIAGNOSIS — D22 MELANOCYTIC NEVI: ICD-10-CM

## 2025-05-08 DIAGNOSIS — L81.4 OTHER MELANIN HYPERPIGMENTATION: ICD-10-CM

## 2025-05-08 DIAGNOSIS — L82.1 OTHER SEBORRHEIC KERATOSIS: ICD-10-CM

## 2025-05-08 DIAGNOSIS — Z12.83 ENCOUNTER FOR SCREENING FOR MALIGNANT NEOPLASM OF SKIN: ICD-10-CM

## 2025-05-08 DIAGNOSIS — D18.0 HEMANGIOMA: ICD-10-CM

## 2025-05-08 PROBLEM — D18.01 HEMANGIOMA OF SKIN AND SUBCUTANEOUS TISSUE: Status: ACTIVE | Noted: 2025-05-08

## 2025-05-08 PROBLEM — D22.5 MELANOCYTIC NEVI OF TRUNK: Status: ACTIVE | Noted: 2025-05-08

## 2025-05-08 PROCEDURE — ? TREATMENT REGIMEN

## 2025-05-08 PROCEDURE — 99213 OFFICE O/P EST LOW 20 MIN: CPT

## 2025-05-08 PROCEDURE — ? COUNSELING

## 2025-05-08 PROCEDURE — ? ADDITIONAL NOTES

## 2025-05-08 ASSESSMENT — LOCATION ZONE DERM: LOCATION ZONE: TRUNK

## 2025-05-08 ASSESSMENT — LOCATION SIMPLE DESCRIPTION DERM
LOCATION SIMPLE: RIGHT UPPER BACK
LOCATION SIMPLE: ABDOMEN
LOCATION SIMPLE: UPPER BACK
LOCATION SIMPLE: CHEST
LOCATION SIMPLE: LEFT UPPER BACK

## 2025-05-08 ASSESSMENT — LOCATION DETAILED DESCRIPTION DERM
LOCATION DETAILED: RIGHT INFERIOR MEDIAL UPPER BACK
LOCATION DETAILED: UPPER STERNUM
LOCATION DETAILED: LEFT MEDIAL UPPER BACK
LOCATION DETAILED: PERIUMBILICAL SKIN
LOCATION DETAILED: SUPERIOR THORACIC SPINE